# Patient Record
Sex: MALE | URBAN - METROPOLITAN AREA
[De-identification: names, ages, dates, MRNs, and addresses within clinical notes are randomized per-mention and may not be internally consistent; named-entity substitution may affect disease eponyms.]

---

## 2017-05-18 NOTE — PATIENT DISCUSSION
CATARACTS OU - THE NATURE OF CATARACT SURGERY WAS EXPLAINED TO THE PATIENT. IT WAS EXPLAINED THAT RETINA PROBLEMS CAN MAKE THE RESULTS OF CATARACT SURGERY UNCERTAIN, AND THAT THE WORSE THE CATARACT, THE BETTER THE CHANCE OF IMPROVEMENT.

## 2017-05-18 NOTE — PATIENT DISCUSSION
PROLIFERATIVE DIABETIC RETINOPATHY OU: NO TREATMENT IS NEEDED TODAY. WILL CONTINUE TO FOLLOW WITH DILATED EXAMS AS SCHEDULED.

## 2019-07-30 ENCOUNTER — IMPORTED ENCOUNTER (OUTPATIENT)
Dept: URBAN - METROPOLITAN AREA CLINIC 9 | Facility: CLINIC | Age: 84
End: 2019-07-30

## 2021-05-14 NOTE — PATIENT DISCUSSION
PROLIFERATIVE DIABETIC RETINOPATHY OU: OCT STABLE, NO TREATMENT IS NEEDED TODAY. WILL CONTINUE TO FOLLOW WITH DILATED EXAMS AS SCHEDULED WITH DR. PUENTES.

## 2021-10-16 ASSESSMENT — VISUAL ACUITY
OS_SC: 20/40 SN
OD_SC: 20/30 -2 SN

## 2021-10-16 ASSESSMENT — TONOMETRY
OS_IOP_MMHG: 12
OD_IOP_MMHG: 16

## 2024-03-15 NOTE — PATIENT DISCUSSION
10/31/2018OS+0.50+[phone]+2.5020/40&nbsp; &nbsp; &nbsp; BETSY
Anti-reflective
Bifocal - Daily wearOD+0.25+0.56986+2.5020/40&nbsp;SN &nbsp; &nbsp; BETSY
General:
Glasses Prescribed:
Lens Material:
Lens Treatment:
PRESBYOPIA, OU: PRESCRIBED GLASSES. FOLLOW-UP AS SCHEDULED.
Slab Off:No
UV Protection
Vertex Distance:
spherecylinderaxisaddprismvertexVAInt VANVExaminer
Adequate: hears normal conversation without difficulty